# Patient Record
Sex: MALE | Race: OTHER | HISPANIC OR LATINO
[De-identification: names, ages, dates, MRNs, and addresses within clinical notes are randomized per-mention and may not be internally consistent; named-entity substitution may affect disease eponyms.]

---

## 2022-07-27 ENCOUNTER — APPOINTMENT (OUTPATIENT)
Dept: ORTHOPEDIC SURGERY | Facility: CLINIC | Age: 52
End: 2022-07-27

## 2022-07-27 DIAGNOSIS — S43.439A SUPERIOR GLENOID LABRUM LESION OF UNSPECIFIED SHOULDER, INITIAL ENCOUNTER: ICD-10-CM

## 2022-07-27 PROBLEM — Z00.00 ENCOUNTER FOR PREVENTIVE HEALTH EXAMINATION: Status: ACTIVE | Noted: 2022-07-27

## 2022-07-27 PROCEDURE — 73030 X-RAY EXAM OF SHOULDER: CPT | Mod: 50

## 2022-07-27 PROCEDURE — 99204 OFFICE O/P NEW MOD 45 MIN: CPT

## 2022-07-27 NOTE — REASON FOR VISIT
[FreeTextEntry2] : Right greater than left shoulder pain for 8 months an acute exacerbation of a chronic problem

## 2022-07-27 NOTE — ASSESSMENT
[FreeTextEntry1] :  right-hand-dominant  with diagnosis of right slap tear recommend therapy is already on a anti-inflammatory cream from his medical doctor see him back in 6 weeks if he is still symptomatic and consider imaging studies such as an MRI

## 2022-07-27 NOTE — PHYSICAL EXAM
[FreeTextEntry3] :  right shoulder has positive [place]'s nontender over the AC joint no effusion full range of motion negative impingement

## 2022-07-27 NOTE — DATA REVIEWED
[FreeTextEntry1] :  x-rays left and right shoulder three views each showing no arthritis no soft tissue calcifications

## 2022-08-17 ENCOUNTER — EMERGENCY (EMERGENCY)
Facility: HOSPITAL | Age: 52
LOS: 0 days | Discharge: HOME | End: 2022-08-17
Attending: EMERGENCY MEDICINE | Admitting: EMERGENCY MEDICINE

## 2022-08-17 VITALS
SYSTOLIC BLOOD PRESSURE: 116 MMHG | OXYGEN SATURATION: 98 % | HEART RATE: 78 BPM | RESPIRATION RATE: 16 BRPM | DIASTOLIC BLOOD PRESSURE: 65 MMHG | WEIGHT: 151.02 LBS | TEMPERATURE: 97 F

## 2022-08-17 DIAGNOSIS — R07.89 OTHER CHEST PAIN: ICD-10-CM

## 2022-08-17 DIAGNOSIS — R55 SYNCOPE AND COLLAPSE: ICD-10-CM

## 2022-08-17 DIAGNOSIS — R51.9 HEADACHE, UNSPECIFIED: ICD-10-CM

## 2022-08-17 DIAGNOSIS — R53.83 OTHER FATIGUE: ICD-10-CM

## 2022-08-17 LAB
ALBUMIN SERPL ELPH-MCNC: 4.5 G/DL — SIGNIFICANT CHANGE UP (ref 3.5–5.2)
ALP SERPL-CCNC: 71 U/L — SIGNIFICANT CHANGE UP (ref 30–115)
ALT FLD-CCNC: 35 U/L — SIGNIFICANT CHANGE UP (ref 0–41)
ANION GAP SERPL CALC-SCNC: 9 MMOL/L — SIGNIFICANT CHANGE UP (ref 7–14)
AST SERPL-CCNC: 32 U/L — SIGNIFICANT CHANGE UP (ref 0–41)
BASOPHILS # BLD AUTO: 0.03 K/UL — SIGNIFICANT CHANGE UP (ref 0–0.2)
BASOPHILS NFR BLD AUTO: 0.7 % — SIGNIFICANT CHANGE UP (ref 0–1)
BILIRUB SERPL-MCNC: 0.7 MG/DL — SIGNIFICANT CHANGE UP (ref 0.2–1.2)
BUN SERPL-MCNC: 14 MG/DL — SIGNIFICANT CHANGE UP (ref 10–20)
CALCIUM SERPL-MCNC: 8.9 MG/DL — SIGNIFICANT CHANGE UP (ref 8.5–10.1)
CHLORIDE SERPL-SCNC: 102 MMOL/L — SIGNIFICANT CHANGE UP (ref 98–110)
CO2 SERPL-SCNC: 27 MMOL/L — SIGNIFICANT CHANGE UP (ref 17–32)
CREAT SERPL-MCNC: 0.9 MG/DL — SIGNIFICANT CHANGE UP (ref 0.7–1.5)
EGFR: 104 ML/MIN/1.73M2 — SIGNIFICANT CHANGE UP
EOSINOPHIL # BLD AUTO: 0.03 K/UL — SIGNIFICANT CHANGE UP (ref 0–0.7)
EOSINOPHIL NFR BLD AUTO: 0.7 % — SIGNIFICANT CHANGE UP (ref 0–8)
GLUCOSE SERPL-MCNC: 89 MG/DL — SIGNIFICANT CHANGE UP (ref 70–99)
HCT VFR BLD CALC: 44.4 % — SIGNIFICANT CHANGE UP (ref 42–52)
HGB BLD-MCNC: 13.8 G/DL — LOW (ref 14–18)
IMM GRANULOCYTES NFR BLD AUTO: 0.2 % — SIGNIFICANT CHANGE UP (ref 0.1–0.3)
LYMPHOCYTES # BLD AUTO: 1.76 K/UL — SIGNIFICANT CHANGE UP (ref 1.2–3.4)
LYMPHOCYTES # BLD AUTO: 43.8 % — SIGNIFICANT CHANGE UP (ref 20.5–51.1)
MAGNESIUM SERPL-MCNC: 1.8 MG/DL — SIGNIFICANT CHANGE UP (ref 1.8–2.4)
MCHC RBC-ENTMCNC: 26.1 PG — LOW (ref 27–31)
MCHC RBC-ENTMCNC: 31.1 G/DL — LOW (ref 32–37)
MCV RBC AUTO: 83.9 FL — SIGNIFICANT CHANGE UP (ref 80–94)
MONOCYTES # BLD AUTO: 0.42 K/UL — SIGNIFICANT CHANGE UP (ref 0.1–0.6)
MONOCYTES NFR BLD AUTO: 10.4 % — HIGH (ref 1.7–9.3)
NEUTROPHILS # BLD AUTO: 1.77 K/UL — SIGNIFICANT CHANGE UP (ref 1.4–6.5)
NEUTROPHILS NFR BLD AUTO: 44.2 % — SIGNIFICANT CHANGE UP (ref 42.2–75.2)
NRBC # BLD: 0 /100 WBCS — SIGNIFICANT CHANGE UP (ref 0–0)
NT-PROBNP SERPL-SCNC: 23 PG/ML — SIGNIFICANT CHANGE UP (ref 0–300)
PLATELET # BLD AUTO: 182 K/UL — SIGNIFICANT CHANGE UP (ref 130–400)
POTASSIUM SERPL-MCNC: 5.2 MMOL/L — HIGH (ref 3.5–5)
POTASSIUM SERPL-SCNC: 5.2 MMOL/L — HIGH (ref 3.5–5)
PROT SERPL-MCNC: 7 G/DL — SIGNIFICANT CHANGE UP (ref 6–8)
RBC # BLD: 5.29 M/UL — SIGNIFICANT CHANGE UP (ref 4.7–6.1)
RBC # FLD: 13.1 % — SIGNIFICANT CHANGE UP (ref 11.5–14.5)
SODIUM SERPL-SCNC: 138 MMOL/L — SIGNIFICANT CHANGE UP (ref 135–146)
TROPONIN T SERPL-MCNC: <0.01 NG/ML — SIGNIFICANT CHANGE UP
WBC # BLD: 4.02 K/UL — LOW (ref 4.8–10.8)
WBC # FLD AUTO: 4.02 K/UL — LOW (ref 4.8–10.8)

## 2022-08-17 PROCEDURE — 70450 CT HEAD/BRAIN W/O DYE: CPT | Mod: 26,MA

## 2022-08-17 PROCEDURE — 93010 ELECTROCARDIOGRAM REPORT: CPT

## 2022-08-17 PROCEDURE — 71046 X-RAY EXAM CHEST 2 VIEWS: CPT | Mod: 26

## 2022-08-17 PROCEDURE — 99285 EMERGENCY DEPT VISIT HI MDM: CPT

## 2022-08-17 RX ORDER — ACETAMINOPHEN 500 MG
975 TABLET ORAL ONCE
Refills: 0 | Status: COMPLETED | OUTPATIENT
Start: 2022-08-17 | End: 2022-08-17

## 2022-08-17 RX ORDER — SODIUM CHLORIDE 9 MG/ML
1000 INJECTION INTRAMUSCULAR; INTRAVENOUS; SUBCUTANEOUS ONCE
Refills: 0 | Status: COMPLETED | OUTPATIENT
Start: 2022-08-17 | End: 2022-08-17

## 2022-08-17 RX ADMIN — Medication 975 MILLIGRAM(S): at 12:44

## 2022-08-17 RX ADMIN — SODIUM CHLORIDE 1000 MILLILITER(S): 9 INJECTION INTRAMUSCULAR; INTRAVENOUS; SUBCUTANEOUS at 12:44

## 2022-08-17 NOTE — ED PROVIDER NOTE - CLINICAL SUMMARY MEDICAL DECISION MAKING FREE TEXT BOX
52yoM prev healthy presents with syncope. States last night was laying back almost supine watching TV and got up to go to the kitchen. Felt lightheaded and held onto a table and passed out. Assisted to the ground and no trauma. Also notes daily intermittent frontal pressure like HA x1 mth. Also mild intermittent nonradiating L sided CP x 6 yrs ever since a cardiac cath, entirely unchanged or worsened. Denies all other symptoms including v/d, black or bloody stool, fever, SOB, cough, recent long distance travel, hormone use. On exam, afebrile, hemodynamically stable, saturating well, NAD, well appearing, laying comfortably in bed, no WOB, speaking full sentences, head NCAT, EOMI grossly, anicteric, MMM, no JVD, RRR, nml S1/S2, no m/r/g, lungs CTAB, no w/r/r, abd soft, NT, ND, nml BS, no rebound or guarding, AAO, CN's 3-12 intact, motor 5/5 in all extrems, BENTON spontaneously, no leg cyanosis or edema, skin warm, well perfused, no rashes or hives. No trauma. Character low suspicion for CVA and no focal or localizing findings. Character low suspicion for SAH. No ICH on CT head. No arrhythmia or e/o aortic outflow obstruction. No significant anemia or bleeding or gross electrolyte abnormalities. No new CP/SOB to suggest ACS or e/o DVT to suggest PE. No fever or specific infectious symptoms. May be orthostatic in nature. Given IVF. Patient is well appearing, NAD, afebrile, hemodynamically stable. Corry Syncope score very low risk. Any available tests and studies were discussed with patient. Discharged with instructions in further symptomatic care, return precautions, and need for cardio f/u.

## 2022-08-17 NOTE — ED ADULT TRIAGE NOTE - CHIEF COMPLAINT QUOTE
pt came to ED s/p syncopal episode last night while in his kitchen. reports headache, chest pain. pt also reports "difficulty speaking for the last few months"

## 2022-08-17 NOTE — ED PROVIDER NOTE - NS ED ATTENDING STATEMENT MOD
This was a shared visit with the JEN. I reviewed and verified the documentation and independently performed the documented:

## 2022-08-17 NOTE — ED PROVIDER NOTE - OBJECTIVE STATEMENT
52-year-old male no past medical history presenting to the ED for evaluation of syncopal episode last night.  Patient reports he was lying in bed when he got up to go to the kitchen and had a mild frontal headache and felt fatigued, had a syncopal episode lasted 1 minute.  Patient also endorsing intermittent left-sided chest pain for 1 year.  Patient denies active headache at this time, no active chest pain.  Denies any fever, chills, dizziness, numbness/tingling, weakness, shortness of breath.

## 2022-08-17 NOTE — ED PROVIDER NOTE - NS ED ROS FT
Constitutional: (-) fever (-) malaise (-) diaphoresis (-) chills   Eyes: (-) visual changes (-) eye pain (-) eye discharge (-) photophobia (-) FB sensation  Cardiac: (+) chest pain  (-) palpitations (-) syncope (-) edema  Respiratory: (-) cough (-) SOB (-) THOMASON  GI: (-) nausea (-) vomiting (-) diarrhea (-) abdominal pain   : (-) dysuria (-) increased frequency  (-) hematuria (-) incontinence  MS: (-) back pain (-) myalgia (-) muscle weakness (-)  joint pain  Neuro: (+) headache (-) dizziness (-) numbness/tingling to extremities B/L (-) weakness  Skin: (-) rash (-) laceration    Except as documented in the HPI, all other systems are negative.

## 2022-08-17 NOTE — ED PROVIDER NOTE - ATTENDING APP SHARED VISIT CONTRIBUTION OF CARE
52yoM prev healthy presents with syncope. States last night was laying back almost supine watching TV and got up to go to the kitchen. Felt lightheaded and held onto a table and passed out. Assisted to the ground and no trauma. Also notes daily intermittent frontal pressure like HA x1 mth. Also mild intermittent nonradiating L sided CP x 6 yrs ever since a cardiac cath, entirely unchanged or worsened. Denies all other symptoms including v/d, black or bloody stool, fever, SOB, cough, recent long distance travel, hormone use. On exam, afebrile, hemodynamically stable, saturating well, NAD, well appearing, laying comfortably in bed, no WOB, speaking full sentences, head NCAT, EOMI grossly, anicteric, MMM, no JVD, RRR, nml S1/S2, no m/r/g, lungs CTAB, no w/r/r, abd soft, NT, ND, nml BS, no rebound or guarding, AAO, CN's 3-12 intact, motor 5/5 in all extrems, B[place] spontaneously, no leg cyanosis or edema, skin warm, well perfused, no rashes or hives. No trauma. Character low suspicion for CVA and no focal or localizing findings. Character low suspicion for SAH. No ICH on CT head. No arrhythmia or e/o aortic outflow obstruction. No significant anemia or bleeding or gross electrolyte abnormalities. No new CP/SOB to suggest ACS or e/o DVT to suggest PE. No fever or specific infectious symptoms. May be orthostatic in nature. Given IVF. Patient is well appearing, NAD, afebrile, hemodynamically stable. Preston Syncope score very low risk. Any available tests and studies were discussed with patient. Discharged with instructions in further symptomatic care, return precautions, and need for cardio f/u.

## 2022-08-17 NOTE — ED PROVIDER NOTE - NSFOLLOWUPCLINICS_GEN_ALL_ED_FT
[facility]  Cardiac Rehab  [address] [place] [address]  Phone: [phone]  Fax:   Follow Up Time: 4-6 Days

## 2022-08-17 NOTE — ED PROVIDER NOTE - CARE PLAN
1 Principal Discharge DX:	Syncope  Secondary Diagnosis:	Chest pain   Principal Discharge DX:	Syncope  Secondary Diagnosis:	Chest pain  Secondary Diagnosis:	Headache

## 2022-08-17 NOTE — ED PROVIDER NOTE - NSFOLLOWUPINSTRUCTIONS_ED_ALL_ED_FT
Our Emergency Department Referral Coordinators will be reaching out ot you in the next 24-48 hours from 9:00am to 5:00pm (Monday to Friday) with a follow up appointment. Please expect a phone call from the hospital in that time frame. If you do not receive a call or if you have any questions or concerns, you can reach them at [phone] or [phone].      You were noted to have what is called, a syncopal episode, which is an event when you temporarily lose consciousness. These events happen for a variety of reasons and you were kept in the hospital to evaluate what the cause of your event was. Thankfully, these events in themselves do not leave any long lasting effects on your body, however, they may happen again if the cause of the problem is not found and treated if need be. Many people never find out what caused their event. If you have been advised to follow up with any doctors, or specialists, please be sure to do so.     Chest Pain    Chest pain can be caused by many different conditions which may or may not be dangerous. Causes include heartburn, lung infections, heart attack, blood clot in lungs, skin infections, strain or damage to muscle, cartilage, or bones, etc. In addition to a history and physical examination, an electrocardiogram (ECG) or other lab tests may have been performed to determine the cause of your chest pain. Follow up with your primary care provider or with a cardiologist as instructed.     SEEK IMMEDIATE MEDICAL CARE IF YOU HAVE ANY OF THE FOLLOWING SYMPTOMS: worsening chest pain, coughing up blood, unexplained back/neck/jaw pain, severe abdominal pain, dizziness or lightheadedness, fainting, shortness of breath, sweaty or clammy skin, vomiting, or racing heart beat. These symptoms may represent a serious problem that is an emergency. Do not wait to see if the symptoms will go away. Get medical help right away. Call 911 and do not drive yourself to the hospital.

## 2022-08-17 NOTE — ED PROVIDER NOTE - PATIENT PORTAL LINK FT
You can access the FollowMyHealth Patient Portal offered by [facility] by registering at the following website: [email or web address]. By joining [facility]’s FollowMyHealth portal, you will also be able to view your health information using other applications (apps) compatible with our system.

## 2022-10-24 ENCOUNTER — APPOINTMENT (OUTPATIENT)
Dept: ORTHOPEDIC SURGERY | Facility: CLINIC | Age: 52
End: 2022-10-24

## 2022-10-24 DIAGNOSIS — S43.431D SUPERIOR GLENOID LABRUM LESION OF RIGHT SHOULDER, SUBSEQUENT ENCOUNTER: ICD-10-CM

## 2022-10-24 PROCEDURE — 99213 OFFICE O/P EST LOW 20 MIN: CPT

## 2022-10-24 NOTE — PHYSICAL EXAM
[Right] : right shoulder [] : motor and sensory intact distally [TWNoteComboBox7] : active forward flexion 165 degrees [TWNoteComboBox4] : passive forward flexion 180 degrees [de-identified] : active abduction 130 degrees [TWNoteComboBox6] : internal rotation L4

## 2022-10-24 NOTE — DISCUSSION/SUMMARY
[de-identified] :   At this point I recommend an MRI right shoulder to evaluate for a labral tear.  Please send authorization for that.  He has had more than 6 weeks of physician directed treatment including anti-inflammatory medication and home therapy exercises but still has pain in the shoulder.  He will call me 2 days after the MRI is performed so we can discuss results, I will see him in 2 months for further evaluation.\par \par Supervising physician:  Dr. Hernandez

## 2022-10-24 NOTE — HISTORY OF PRESENT ILLNESS
[de-identified] : The patient is a 52-year-old male here for subsequent re-evaluation of his right shoulder.  He is still having pain in the shoulder but it has decreased in severity since he stopped doing  work.  He has done home therapy exercises for the shoulder.  He also has used anti-inflammatory cream given by his primary care physician.

## 2022-11-07 ENCOUNTER — APPOINTMENT (OUTPATIENT)
Dept: MRI IMAGING | Facility: CLINIC | Age: 52
End: 2022-11-07

## 2022-11-15 ENCOUNTER — EMERGENCY (EMERGENCY)
Facility: HOSPITAL | Age: 52
LOS: 0 days | Discharge: HOME | End: 2022-11-15
Attending: EMERGENCY MEDICINE | Admitting: EMERGENCY MEDICINE

## 2022-11-15 VITALS
DIASTOLIC BLOOD PRESSURE: 84 MMHG | TEMPERATURE: 98 F | SYSTOLIC BLOOD PRESSURE: 136 MMHG | RESPIRATION RATE: 18 BRPM | HEART RATE: 78 BPM | OXYGEN SATURATION: 100 %

## 2022-11-15 DIAGNOSIS — R42 DIZZINESS AND GIDDINESS: ICD-10-CM

## 2022-11-15 DIAGNOSIS — Z20.822 CONTACT WITH AND (SUSPECTED) EXPOSURE TO COVID-19: ICD-10-CM

## 2022-11-15 DIAGNOSIS — R51.9 HEADACHE, UNSPECIFIED: ICD-10-CM

## 2022-11-15 DIAGNOSIS — R20.0 ANESTHESIA OF SKIN: ICD-10-CM

## 2022-11-15 DIAGNOSIS — E78.00 PURE HYPERCHOLESTEROLEMIA, UNSPECIFIED: ICD-10-CM

## 2022-11-15 LAB
A1C WITH ESTIMATED AVERAGE GLUCOSE RESULT: 5.6 % — SIGNIFICANT CHANGE UP (ref 4–5.6)
ALBUMIN SERPL ELPH-MCNC: 4.7 G/DL — SIGNIFICANT CHANGE UP (ref 3.5–5.2)
ALP SERPL-CCNC: 74 U/L — SIGNIFICANT CHANGE UP (ref 30–115)
ALT FLD-CCNC: 39 U/L — SIGNIFICANT CHANGE UP (ref 0–41)
ANION GAP SERPL CALC-SCNC: 7 MMOL/L — SIGNIFICANT CHANGE UP (ref 7–14)
APTT BLD: 32.4 SEC — SIGNIFICANT CHANGE UP (ref 27–39.2)
AST SERPL-CCNC: 25 U/L — SIGNIFICANT CHANGE UP (ref 0–41)
BASOPHILS # BLD AUTO: 0.03 K/UL — SIGNIFICANT CHANGE UP (ref 0–0.2)
BASOPHILS NFR BLD AUTO: 0.7 % — SIGNIFICANT CHANGE UP (ref 0–1)
BILIRUB SERPL-MCNC: 0.3 MG/DL — SIGNIFICANT CHANGE UP (ref 0.2–1.2)
BUN SERPL-MCNC: 10 MG/DL — SIGNIFICANT CHANGE UP (ref 10–20)
CALCIUM SERPL-MCNC: 9.1 MG/DL — SIGNIFICANT CHANGE UP (ref 8.4–10.5)
CHLORIDE SERPL-SCNC: 101 MMOL/L — SIGNIFICANT CHANGE UP (ref 98–110)
CHOLEST SERPL-MCNC: 145 MG/DL — SIGNIFICANT CHANGE UP
CK MB CFR SERPL CALC: 1.2 NG/ML — SIGNIFICANT CHANGE UP (ref 0.6–6.3)
CK SERPL-CCNC: 139 U/L — SIGNIFICANT CHANGE UP (ref 0–225)
CO2 SERPL-SCNC: 27 MMOL/L — SIGNIFICANT CHANGE UP (ref 17–32)
CREAT SERPL-MCNC: 1.1 MG/DL — SIGNIFICANT CHANGE UP (ref 0.7–1.5)
EGFR: 82 ML/MIN/1.73M2 — SIGNIFICANT CHANGE UP
EOSINOPHIL # BLD AUTO: 0.04 K/UL — SIGNIFICANT CHANGE UP (ref 0–0.7)
EOSINOPHIL NFR BLD AUTO: 1 % — SIGNIFICANT CHANGE UP (ref 0–8)
ESTIMATED AVERAGE GLUCOSE: 114 MG/DL — SIGNIFICANT CHANGE UP (ref 68–114)
GLUCOSE SERPL-MCNC: 88 MG/DL — SIGNIFICANT CHANGE UP (ref 70–99)
HCT VFR BLD CALC: 45.1 % — SIGNIFICANT CHANGE UP (ref 42–52)
HDLC SERPL-MCNC: 31 MG/DL — LOW
HGB BLD-MCNC: 14.2 G/DL — SIGNIFICANT CHANGE UP (ref 14–18)
IMM GRANULOCYTES NFR BLD AUTO: 0.2 % — SIGNIFICANT CHANGE UP (ref 0.1–0.3)
INR BLD: 1.07 RATIO — SIGNIFICANT CHANGE UP (ref 0.65–1.3)
LIPID PNL WITH DIRECT LDL SERPL: 92 MG/DL — SIGNIFICANT CHANGE UP
LYMPHOCYTES # BLD AUTO: 1.34 K/UL — SIGNIFICANT CHANGE UP (ref 1.2–3.4)
LYMPHOCYTES # BLD AUTO: 32.4 % — SIGNIFICANT CHANGE UP (ref 20.5–51.1)
MCHC RBC-ENTMCNC: 26.6 PG — LOW (ref 27–31)
MCHC RBC-ENTMCNC: 31.5 G/DL — LOW (ref 32–37)
MCV RBC AUTO: 84.6 FL — SIGNIFICANT CHANGE UP (ref 80–94)
MONOCYTES # BLD AUTO: 0.4 K/UL — SIGNIFICANT CHANGE UP (ref 0.1–0.6)
MONOCYTES NFR BLD AUTO: 9.7 % — HIGH (ref 1.7–9.3)
NEUTROPHILS # BLD AUTO: 2.31 K/UL — SIGNIFICANT CHANGE UP (ref 1.4–6.5)
NEUTROPHILS NFR BLD AUTO: 56 % — SIGNIFICANT CHANGE UP (ref 42.2–75.2)
NON HDL CHOLESTEROL: 114 MG/DL — SIGNIFICANT CHANGE UP
NRBC # BLD: 0 /100 WBCS — SIGNIFICANT CHANGE UP (ref 0–0)
PLATELET # BLD AUTO: 203 K/UL — SIGNIFICANT CHANGE UP (ref 130–400)
POTASSIUM SERPL-MCNC: 4.3 MMOL/L — SIGNIFICANT CHANGE UP (ref 3.5–5)
POTASSIUM SERPL-SCNC: 4.3 MMOL/L — SIGNIFICANT CHANGE UP (ref 3.5–5)
PROT SERPL-MCNC: 7 G/DL — SIGNIFICANT CHANGE UP (ref 6–8)
PROTHROM AB SERPL-ACNC: 12.2 SEC — SIGNIFICANT CHANGE UP (ref 9.95–12.87)
RBC # BLD: 5.33 M/UL — SIGNIFICANT CHANGE UP (ref 4.7–6.1)
RBC # FLD: 13 % — SIGNIFICANT CHANGE UP (ref 11.5–14.5)
SARS-COV-2 RNA SPEC QL NAA+PROBE: SIGNIFICANT CHANGE UP
SODIUM SERPL-SCNC: 135 MMOL/L — SIGNIFICANT CHANGE UP (ref 135–146)
TRIGL SERPL-MCNC: 108 MG/DL — SIGNIFICANT CHANGE UP
TROPONIN T SERPL-MCNC: <0.01 NG/ML — SIGNIFICANT CHANGE UP
WBC # BLD: 4.13 K/UL — LOW (ref 4.8–10.8)
WBC # FLD AUTO: 4.13 K/UL — LOW (ref 4.8–10.8)

## 2022-11-15 PROCEDURE — 70498 CT ANGIOGRAPHY NECK: CPT | Mod: 26,MA

## 2022-11-15 PROCEDURE — 93010 ELECTROCARDIOGRAM REPORT: CPT

## 2022-11-15 PROCEDURE — 71045 X-RAY EXAM CHEST 1 VIEW: CPT | Mod: 26

## 2022-11-15 PROCEDURE — 70496 CT ANGIOGRAPHY HEAD: CPT | Mod: 26,MA

## 2022-11-15 PROCEDURE — 99220: CPT

## 2022-11-15 PROCEDURE — 0042T: CPT

## 2022-11-15 RX ORDER — METOCLOPRAMIDE HCL 10 MG
10 TABLET ORAL ONCE
Refills: 0 | Status: COMPLETED | OUTPATIENT
Start: 2022-11-15 | End: 2022-11-15

## 2022-11-15 RX ORDER — KETOROLAC TROMETHAMINE 30 MG/ML
15 SYRINGE (ML) INJECTION ONCE
Refills: 0 | Status: DISCONTINUED | OUTPATIENT
Start: 2022-11-15 | End: 2022-11-15

## 2022-11-15 RX ORDER — MECLIZINE HCL 12.5 MG
50 TABLET ORAL ONCE
Refills: 0 | Status: COMPLETED | OUTPATIENT
Start: 2022-11-15 | End: 2022-11-15

## 2022-11-15 RX ADMIN — Medication 15 MILLIGRAM(S): at 17:20

## 2022-11-15 RX ADMIN — Medication 50 MILLIGRAM(S): at 17:31

## 2022-11-15 RX ADMIN — Medication 10 MILLIGRAM(S): at 17:31

## 2022-11-15 NOTE — ED PROVIDER NOTE - CLINICAL SUMMARY MEDICAL DECISION MAKING FREE TEXT BOX
52-year-old Greek-speaking male past medical history of dyslipidemia presents to the emergency department with headache and dizziness and right hand tingling that started this morning.  Patient reports he has pain to the top of his head into the back of his neck and has intermittent headaches had 1 this morning.  Patient reports his left hand started tingling given his symptoms he came to the ED for evaluation.  Stroke code called upon arrival in triage.  Fingerstick done normal.    Patient rushed to CT scan to evaluate brain and CTA and perfusion studies performed.  Initial NIH score was 1 for sensation to left hand.  Large-bore IV placed and full labs sent.  Patient has no active chest pain but does report pain to his chest on and off occasionally.  Patient evaluated by neurology at bedside.    ED work-up reviewed and shows no acute neurological findings.  Neurology consult appreciated we will send patient status for further work-up and management, telemetry monitoring, MRI.  Patient given meclizine, Reglan, Toradol and fluids for symptoms.  After CT scan patient ambulatory with me to the bathroom and had no focal issues.

## 2022-11-15 NOTE — ED ADULT TRIAGE NOTE - CHIEF COMPLAINT QUOTE
C/o headache and dizziness and headache x 30 minutes. As per EMS, pts coworker noticed slurred speech which has now resolved

## 2022-11-15 NOTE — ED PROVIDER NOTE - CARE PLAN
1 Principal Discharge DX:	Headache  Secondary Diagnosis:	Dizziness  Secondary Diagnosis:	Hand tingling

## 2022-11-15 NOTE — ED PROVIDER NOTE - PHYSICAL EXAMINATION
CONSTITUTIONAL: NAD  SKIN: Warm dry  HEAD: NCAT  EYES: NL inspection  ENT: MMM  NECK: Supple; non tender.  CARD: RRR  RESP: CTAB  ABD: S/NT no R/G  EXT: no pedal edema  NEURO: Grossly unremarkable, +decreased left hand sensation, motor strength intact bl  PSYCH: Cooperative, appropriate.

## 2022-11-15 NOTE — ED PROVIDER NOTE - OBJECTIVE STATEMENT
51 yo m w ho hld pw headache and right hand tingling. Patient was working and between [id number] while stressed on the computer. He developed a frontal headache 5/10, chest palpitations and subtle lightheadedness. Admits to hand tingling

## 2022-11-15 NOTE — CONSULT NOTE ADULT - SUBJECTIVE AND OBJECTIVE BOX
Neurology Consult    Patient is a 52y old  Male who presents with a chief complaint of headache    HPI:  52 year old gentleman with a PMH of DLD presents to the ED with headache and right hand tingling. Patient was working and between [id number] he was stressed at work while on the computer. He developed a frontal headache 5/10 and subtle lightheadedness. Patient reports left hand tingling.   Stroke code on arrival.     PAST MEDICAL & SURGICAL HISTORY:      FAMILY HISTORY:      Social History: (-) x 3    Allergies    No Known Allergies    Intolerances        MEDICATIONS  (STANDING):    MEDICATIONS  (PRN):    Vital Signs Last 24 Hrs  T(C): 36.6 (15 Nov 2022 14:38), Max: 36.6 (15 Nov 2022 14:38)  T(F): 97.9 (15 Nov 2022 14:38), Max: 97.9 (15 Nov 2022 14:38)  HR: 78 (15 Nov 2022 14:38) (78 - 78)  BP: 136/84 (15 Nov 2022 14:38) (136/84 - 136/84)  BP(mean): --  RR: 18 (15 Nov 2022 14:38) (18 - 18)  SpO2: 100% (15 Nov 2022 14:38) (100% - 100%)    Parameters below as of 15 Nov 2022 14:38  Patient On (Oxygen Delivery Method): nasal cannula        Examination:  General:  Appearance is consistent with chronologic age.  No abnormal facies.  Gross skin survey within normal limits.    Cognitive/Language:  The patient is oriented to person, place, time and date.  Recent and remote memory intact.  Fund of knowledge is intact and normal.  Language with normal repetition, comprehension and naming.  Nondysarthric.    Eyes: intact VA, VFF.  EOMI w/o nystagmus, skew or reported double vision.  PERRL.  No ptosis/weakness of eyelid closure.    Face:  Facial sensation normal V1 - 3, no facial asymmetry.    Motor examination:   Normal tone, bulk and range of motion.  No tenderness, twitching, tremors or involuntary movements.  Formal Muscle Strength Testing: (MRC grade R/L) 5/5 UE; 5/5 LE.  No observable drift.  Sensory examination:   Intact to light touch in all extremities except decreased sensation to left hand.  Cerebellum:   FTN/HKS intact with normal JULIETA in all limbs.  No dysmetria or dysdiadokinesia.  Gait deferred    NIHSS 1    Labs:   CBC Full  -  ( 15 Nov 2022 15:29 )  WBC Count : 4.13 K/uL  RBC Count : 5.33 M/uL  Hemoglobin : 14.2 g/dL  Hematocrit : 45.1 %  Platelet Count - Automated : 203 K/uL  Mean Cell Volume : 84.6 fL  Mean Cell Hemoglobin : 26.6 pg  Mean Cell Hemoglobin Concentration : 31.5 g/dL  Auto Neutrophil # : 2.31 K/uL  Auto Lymphocyte # : 1.34 K/uL  Auto Monocyte # : 0.40 K/uL  Auto Eosinophil # : 0.04 K/uL  Auto Basophil # : 0.03 K/uL  Auto Neutrophil % : 56.0 %  Auto Lymphocyte % : 32.4 %  Auto Monocyte % : 9.7 %  Auto Eosinophil % : 1.0 %  Auto Basophil % : 0.7 %    11-15    135  |  101  |  10  ----------------------------<  88  4.3   |  27  |  1.1    Ca    9.1      15 Nov 2022 15:29    TPro  7.0  /  Alb  4.7  /  TBili  0.3  /  DBili  x   /  AST  25  /  ALT  39  /  AlkPhos  74  11-15    LIVER FUNCTIONS - ( 15 Nov 2022 15:29 )  Alb: 4.7 g/dL / Pro: 7.0 g/dL / ALK PHOS: 74 U/L / ALT: 39 U/L / AST: 25 U/L / GGT: x           PT/INR - ( 15 Nov 2022 15:29 )   PT: 12.20 sec;   INR: 1.07 ratio         PTT - ( 15 Nov 2022 15:29 )  PTT:32.4 sec        Neuroimaging:  NCHCT:   < from: CT Angio Head w/ IV Cont (11.15.22 @ 15:13) >  IMPRESSION:    CT PERFUSION:  No evidence of perfusion abnormality.    CTA HEAD:  No evidence of flow-limiting stenosis, occlusion or aneurysm.    CTA NECK:  No evidence of carotid or vertebral artery stenosis.      < end of copied text >  < from: CT Head No Cont (08.17.22 @ 12:59) >  IMPRESSION:  No acute intracranial pathology. No evidence of midline shift, mass   effect or intracranial hemorrhage.    Mild chronic microvascular type changes.      < end of copied text >    11-15-22 @ 16:25

## 2022-11-15 NOTE — ED CDU PROVIDER INITIAL DAY NOTE - OBJECTIVE STATEMENT
53 yo M hx of dyslipidemia c/o HA and right hand tingling at 8:10am today.  HA was "7" in severity and has since improved. No CP, SOB, abdominal pains or weakness to extremities.

## 2022-11-15 NOTE — CONSULT NOTE ADULT - ASSESSMENT
Impression:  52 year old gentleman with a PMH of DLD presents to the ED with headache and right hand tingling. Patient was working and between [id number] he was stressed at work while on the computer. He developed a frontal headache 5/10, chest palpitations and subtle lightheadedness. Patient reports left hand tingling. Stroke code on arrival. Patient out of the window for IV thrombolytics. NIHSS 1 for left hand tingling therefore not a candidate for IA intervention. Patient with no acute intracranial pathology on CTH and no LVO on CTA. Differential diagnosis favors complex migraine over ischemic stroke.     Suggestion:  MRI brain without emli  Telemetry monitoring  Q4 neuro checks.   LDL A1C  ED observational unit.

## 2022-11-15 NOTE — ED CDU PROVIDER INITIAL DAY NOTE - MEDICAL DECISION MAKING DETAILS
52-year-old Croatian-speaking male past medical history of dyslipidemia presents to the emergency department with headache and dizziness and right hand tingling that started this morning.  Patient reports he has pain to the top of his head into the back of his neck and has intermittent headaches had 1 this morning.  Patient reports his left hand started tingling given his symptoms he came to the ED for evaluation.  Stroke code called upon arrival in triage.  Fingerstick done normal.    Patient rushed to CT scan to evaluate brain and CTA and perfusion studies performed.  Initial NIH score was 1 for sensation to left hand.  Large-bore IV placed and full labs sent.  Patient has no active chest pain but does report pain to his chest on and off occasionally.  Patient evaluated by neurology at bedside.    ED work-up reviewed and shows no acute neurological findings.  Labs reviewed.  Patient sent status for MRI and telemetry monitoring.  Patient given symptomatic treatment for the headache and dizziness which improved symptoms.

## 2022-11-15 NOTE — ED PROVIDER NOTE - ATTENDING CONTRIBUTION TO CARE
I personally evaluated patient. I agree with the findings and plan with all documentation on chart except as documented  in my note.    52-year-old Nepali-speaking male past medical history of dyslipidemia presents to the emergency department with headache and dizziness and right hand tingling that started this morning.  Patient reports he has pain to the top of his head into the back of his neck and has intermittent headaches had 1 this morning.  Patient reports his left hand started tingling given his symptoms he came to the ED for evaluation.  Stroke code called upon arrival in triage.  Fingerstick done normal.    Patient rushed to CT scan to evaluate brain and CTA and perfusion studies performed.  Initial NIH score was 1 for sensation to left hand.  Large-bore IV placed and full labs sent.  Patient has no active chest pain but does report pain to his chest on and off occasionally.  Patient evaluated by neurology at bedside.    ED work-up reviewed and shows no acute neurological findings.  Neurology consult appreciated we will send patient status for further work-up and management, telemetry monitoring, MRI.  Patient given meclizine, Reglan, Toradol and fluids for symptoms.  After CT scan patient ambulatory with me to the bathroom and had no focal issues.

## 2022-11-15 NOTE — ED CDU PROVIDER INITIAL DAY NOTE - ATTENDING APP SHARED VISIT CONTRIBUTION OF CARE
52-year-old Tongan-speaking male past medical history of dyslipidemia presents to the emergency department with headache and dizziness and right hand tingling that started this morning.  Patient reports he has pain to the top of his head into the back of his neck and has intermittent headaches had 1 this morning.  Patient reports his left hand started tingling given his symptoms he came to the ED for evaluation.  Stroke code called upon arrival in triage.  Fingerstick done normal.    Patient rushed to CT scan to evaluate brain and CTA and perfusion studies performed.  Initial NIH score was 1 for sensation to left hand.  Large-bore IV placed and full labs sent.  Patient has no active chest pain but does report pain to his chest on and off occasionally.  Patient evaluated by neurology at bedside.    ED work-up reviewed and shows no acute neurological findings.  Labs reviewed.  Patient sent status for MRI and telemetry monitoring.  Patient given symptomatic treatment for the headache and dizziness which improved symptoms.

## 2022-11-15 NOTE — ED CDU PROVIDER INITIAL DAY NOTE - NS ED ROS FT
Review of Systems    Constitutional: (-) fever, (-) chills  Eyes/ENT: (-) blurry vision, (-) epistaxis, (-) sore throat  Cardiovascular: (-) chest pain, (-) syncope  Respiratory: (-) cough, (-) shortness of breath  Gastrointestinal: (-) pain, (-) nausea, (-) vomiting, (-) diarrhea  Musculoskeletal: (-) neck pain, (-) back pain, (-) body aches  Integumentary: (-) rash, (-) edema  Neurological: (+) headache, (+) right arm tingling, (-) altered mental status  Psychiatric: (-) hallucinations  Allergic/Immunologic: (-) pruritus

## 2022-11-16 VITALS — WEIGHT: 149.91 LBS

## 2022-11-16 PROCEDURE — 70551 MRI BRAIN STEM W/O DYE: CPT | Mod: 26,MA

## 2022-11-16 PROCEDURE — 99217: CPT

## 2022-11-16 NOTE — ED CDU PROVIDER DISPOSITION NOTE - NSFOLLOWUPCLINICS_GEN_ALL_ED_FT
[facility]  Neurology  [address] [place] [address], [address]  [place] [address]  Phone: [phone]  Fax:   Follow Up Time: 1-3 Days

## 2022-11-16 NOTE — ED CDU PROVIDER DISPOSITION NOTE - NSFOLLOWUPINSTRUCTIONS_ED_ALL_ED_FT
Dolor de antonio general sin causa    General Headache Without Cause      El dolor de antonio es un dolor o malestar que se siente en la aramis de la antonio o del breann. Hay muchas causas y tipos de velasquez de antonio. Entre los tipos más frecuentes, se incluyen los siguientes:  •Cefaleas tensionales.      •Cefaleas migrañosas.      •Cefalea en brotes.      •Cefaleas diarias crónicas.      En ocasiones, el dolor de antonio puede no tener shira causa específica.      Siga estas indicaciones en [place] casa:    Controle [place] afección para detectar cualquier cambio. Informe a [place] médico acerca de cualquier cambio. Siga estos pasos para controlar [place] afección:      Control del dolor       A bag of ice on a towel on the skin.        A heating pad for use on the painful area.      •Use los medicamentos de venta alejo y los recetados solamente johnathon se lo haya indicado el médico. El tratamiento puede incluir medicamentos para el dolor que se beltran por boca o que se aplican sobre la piel.      •Cuando sienta dolor de antonio acuéstese en un cuarto oscuro y tranquilo.      •Mantenga las luces tenues si las luces brillantes le molestan o raina velasquez de antonio empeoran.    •Si se lo indican, aplíquese hielo en la antonio y en la aramis del breann:  •Ponga el hielo en shira bolsa plástica.      •Coloque shira toalla entre la piel y la bolsa.      •Aplique el hielo rosa 20 minutos, 2 a 3 veces al día.      •Retire el hielo si la piel se pone de color borges brillante. [place] es muy importante. Si no puede sentir dolor, calor o frío, tiene un mayor riesgo de que se dañe la aramis.      •Si se lo indican, aplique calor en la aramis afectada. Use la giselle de calor que el médico le recomiende, johnathon shira compresa de calor húmedo o shira almohadilla térmica.  •Coloque shira toalla entre la piel y la giselle de calor.      •Aplique calor orsa 20 a 30 minutos.      •Retire la giselle de calor si la piel se pone de color borges brillante. [place] es especialmente importante si no puede sentir dolor, calor o frío. Corre un mayor riesgo de sufrir quemaduras.        Comida y bebida     •Mantenga un horario para las comidas.    •Si angelina alcohol:•Limite la cantidad que angelina a lo siguiente:  •De 0 a 1 medida por día para las mujeres que no están embarazadas.      • De 0 a 2 medidas por día para los hombres.         •Sepa cuánta cantidad de alcohol hay en las bebidas. En los [place], shira medida equivale a shira botella de cerveza de 12 oz (355 ml), un vaso de vino de 5 oz (148 ml) o un vaso de shira bebida alcohólica de ethan graduación de 1½ oz (44 ml).        •Deje de marly cafeína o disminuya la cantidad que consume.      •Ramila suficiente líquido johnathon para mantener la orina de color amarillo pálido.        Indicaciones generales   A person writing in a journal.  •Lleve un diario de los velasquez de antonio para averiguar qué factores pueden desencadenarlos. Registre, por ejemplo, lo siguiente:  •Lo que usted come y angelina.      •El tiempo que duerme.      •Algún cambio en [place] dieta o en los medicamentos.        •Pruebe algunas técnicas de relajación, johnathon los masajes.      •Limite el estrés.      •Siéntese con la espalda recta y no tense los músculos.      • No consuma ningún producto que contenga nicotina o tabaco. Estos productos incluyen cigarrillos, tabaco para mascar y aparatos de vapeo, johnathon los cigarrillos electrónicos. Si necesita ayuda para dejar de consumir estos productos, consulte al médico.      •Keenan actividad física habitualmente johnathon se lo haya indicado el médico.      •Tenga un horario fijo para dormir. Duerma entre 7 y 9 horas todas las noches o la cantidad de horas que le haya recomendado el médico.      •Concurra a todas las visitas de seguimiento. [place] es importante.        Comuníquese con un médico si:    •Los medicamentos no le alivian los síntomas.      •Tiene un dolor de antonio que es diferente de la cefalea habitual.      •Tiene náuseas o vómitos.      •Tiene fiebre.        Solicite ayuda de inmediato si:  •El dolor de antonio:  •Se vuelve intenso rápidamente.      •Empeora después de hacer actividad física moderada o intensa.      •Tiene alguno de estos síntomas:  •Vómitos repetidos.      •Dolor o rigidez en el breann.      •Cambios en la visión.      •Dolor en un jaiden o en un oído.      •Problemas para hablar.      •Debilidad muscular o pérdida del control muscular.      •Pérdida del equilibrio o de la coordinación.        •Sufre mareos o se desmaya.      •Experimenta confusión.      •Tiene shira convulsión.      Estos síntomas pueden representar un problema grave que constituye shira emergencia. No espere a jamila si los síntomas desaparecen. Solicite atención médica de inmediato. Comuníquese con el servicio de emergencias de [place] localidad (911 en los [place]). No conduzca por raina propios medios hasta el hospital.       Resumen    •El dolor de antonio es un dolor o malestar que se siente en la aramis de la antonio o del breann.      •Hay muchas causas y tipos de velasquez de antonio. En algunos casos, es posible que no se encuentre la causa.      •Lleve un diario de los velasquez de antonio para averiguar qué factores pueden desencadenarlos. Controle [place] afección para detectar cualquier cambio. Informe a [place] médico acerca de cualquier cambio.      •Comuníquese con un médico si tiene un dolor de antonio que es diferente de lo habitual o si los síntomas no se alivian con los medicamentos.      •Solicite ayuda de inmediato si el dolor se vuelve intenso, vomita, tiene pérdida de la visión, pierde el equilibrio o tiene shira convulsión.      Esta información no tiene johnathon fin reemplazar el consejo del médico. Asegúrese de hacerle al médico cualquier pregunta que tenga.      Document Revised: 06/07/2022 Document Reviewed: 06/07/2022    [employer] Patient Education © 2022 [employer]         Parestesia    Paresthesia      La parestesia es shira sensación de ardor o picazón fuera de lo normal. Suele sentirse en las karen, los brazos, las piernas o los pies. Sin embargo, puede manifestarse en cualquier parte del cuerpo. Por lo general, la parestesia no es dolorosa. Se puede sentir johnathon lo siguiente:  •Hormigueo o entumecimiento.      •Vibración.      •Picazón.      La parestesia puede producirse sin causa aparente, o puede ser provocada por:  •Respirar demasiado rápido (hiperventilación).      •Presión en un nervio.      •Shira afección médica subyacente.      •Efectos secundarios de un medicamento.      •Deficiencias nutricionales.      •Exposición a sustancias químicas.      La mayoría de las personas sienten parestesia temporal en algún momento de la sesar. Para algunas personas, puede ser a rose plazo (crónica) debido a shira afección médica subyacente. Si tiene parestesia por mucho tiempo, es necesario que el médico le keenan shira evaluación.      Siga estas instrucciones en [place] casa:      Consumo de alcohol    • No ramila alcohol si:  •[place] médico le indica no hacerlo.      •Está embarazada, puede estar embarazada o está tratando de quedar embarazada.      •Si angelina alcohol:•Limite la cantidad que angelina:  •De 0 a 1 medida por día para las mujeres.      •De 0 a 2 medidas por día para los hombres.        •Esté atento a la cantidad de alcohol que hay en las bebidas que kerry. En [place], shira medida equivale a shira botella de cerveza de 12 oz (355 ml), un vaso de vino de 5 oz (148 ml) o un vaso de shira bebida alcohólica de ethan graduación de 1½ oz (44 ml).          Nutrición    •Siga shira dieta saludable. [place] incluye:  •Consumir alimentos ricos en fibra, johnathon frutas y verduras frescas, cereales integrales y frijoles.      •Limitar el consumo de alimentos ricos en grasas y azúcares procesados, johnathon los alimentos fritos o dulces.        Indicaciones generales     •Use los medicamentos de venta alejo y los recetados solamente johnathon se lo haya indicado el médico.      • No consuma ningún producto que contenga nicotina o tabaco, johnathon cigarrillos y cigarrillos electrónicos. Estos productos evitan que la smiley llegue a los nervios dañados. Si necesita ayuda para dejar de consumir estos productos, consulte al médico.      •Si tiene diabetes, trabaje estrechamente con el médico para mantener bajo control el nivel de azúcar en la smiley.    •Si siente adormecimiento en los pies, keenan lo siguiente:  •Realice controles todos los días para detectar signos de lesión o infección. Observe señales de enrojecimiento, calor e hinchazón.      •Use calcetines acolchados y zapatos cómodos. Estos ayudan a proteger los pies.        •Concurra a todas las visitas de seguimiento johnathon se lo haya indicado el médico. [place] es importante.        Comuníquese con un médico si:    •Tiene parestesia que empeora o no desaparece.      •Siente adormecimiento después de shira lesión.      •La sensación de ardor o picazón empeora al caminar.      •Tiene dolor, calambres o mareos, o se desmaya.      •Aparece shira erupción cutánea.        Solicite ayuda de inmediato si:    •Siente debilidad muscular.      •Comienza a tener debilidad en un brazo o shira pierna.      •Tiene dificultad para caminar o moverse.      •Tiene problemas con el habla, la comprensión o la visión.      •Se siente confundido.      •No puede controlar la función de la vejiga o los intestinos.        Resumen    •La parestesia es shira sensación de ardor o picazón fuera de lo normal que generalmente se siente en las karen, los brazos, las piernas o los pies. También pueden producirse en otras partes del cuerpo.      •La parestesia puede producirse sin causa aparente, o puede deberse a respirar muy rápidamente (hiperventilación), la presión en un nervio, shira afección médica subyacente, los efectos secundarios de un medicamento, deficiencias nutricionales o exposición a sustancias tóxicas.      •Si tiene parestesia por mucho tiempo, es necesario que el médico le keenan shira evaluación.      Esta información no tiene johnathon fin reemplazar el consejo del médico. Asegúrese de hacerle al médico cualquier pregunta que tenga.      Document Revised: 11/23/2021 Document Reviewed: 10/08/2021    Elsevier Patient Education © 2022 [employer] Inc.

## 2022-11-16 NOTE — ED CDU PROVIDER DISPOSITION NOTE - PATIENT PORTAL LINK FT
You can access the FollowMyHealth Patient Portal offered by [facility] by registering at the following website: [email or web address]. By joining [facility]’s FollowMyHealth portal, you will also be able to view your health information using other applications (apps) compatible with our system.

## 2022-11-16 NOTE — ED CDU PROVIDER DISPOSITION NOTE - CLINICAL COURSE
No acute distress.  VSS.  MRI negative.  Feels better.  Seen and evaluated by Neurology.  D/C home.  Strict return instructions discussed.

## 2022-11-16 NOTE — ED ADULT NURSE REASSESSMENT NOTE - NS ED NURSE REASSESS COMMENT FT1
Pt a&ox3, IVL in place, Cb in reach, MA on, no s/s distress. no c.o pain. Pt updated with plan of care, will cont to monitor. wife at bedside
patient provided with lunch tray. wife at the bedside

## 2022-11-16 NOTE — ED CDU PROVIDER SUBSEQUENT DAY NOTE - ATTENDING APP SHARED VISIT CONTRIBUTION OF CARE
52-year-old Canadian-speaking male past medical history of dyslipidemia presents to the emergency department with headache and dizziness and right hand tingling that started this morning.  Patient reports he has pain to the top of his head into the back of his neck and has intermittent headaches had 1 this morning.  Patient reports his left hand started tingling given his symptoms he came to the ED for evaluation.  Stroke code called upon arrival in triage.  Fingerstick done normal.    Patient rushed to CT scan to evaluate brain and CTA and perfusion studies performed.  Initial NIH score was 1 for sensation to left hand.  Large-bore IV placed and full labs sent.  Patient has no active chest pain but does report pain to his chest on and off occasionally.  Patient evaluated by neurology at bedside.    ED work-up reviewed and shows no acute neurological findings.  Labs reviewed.  Patient sent status for MRI and telemetry monitoring.  Patient given symptomatic treatment for the headache and dizziness which improved symptoms.

## 2022-11-16 NOTE — ED CDU PROVIDER SUBSEQUENT DAY NOTE - MEDICAL DECISION MAKING DETAILS
52-year-old Afghan-speaking male past medical history of dyslipidemia presents to the emergency department with headache and dizziness and right hand tingling that started this morning.  Patient reports he has pain to the top of his head into the back of his neck and has intermittent headaches had 1 this morning.  Patient reports his left hand started tingling given his symptoms he came to the ED for evaluation.  Stroke code called upon arrival in triage.  Fingerstick done normal.    Patient rushed to CT scan to evaluate brain and CTA and perfusion studies performed.  Initial NIH score was 1 for sensation to left hand.  Large-bore IV placed and full labs sent.  Patient has no active chest pain but does report pain to his chest on and off occasionally.  Patient evaluated by neurology at bedside.    ED work-up reviewed and shows no acute neurological findings.  Labs reviewed.  Patient sent status for MRI and telemetry monitoring.  Patient given symptomatic treatment for the headache and dizziness which improved symptoms.

## 2022-12-08 ENCOUNTER — NON-APPOINTMENT (OUTPATIENT)
Age: 52
End: 2022-12-08

## 2023-01-09 ENCOUNTER — APPOINTMENT (OUTPATIENT)
Dept: ORTHOPEDIC SURGERY | Facility: CLINIC | Age: 53
End: 2023-01-09

## 2023-07-29 ENCOUNTER — EMERGENCY (EMERGENCY)
Facility: HOSPITAL | Age: 53
LOS: 0 days | Discharge: HOME | End: 2023-07-29
Attending: EMERGENCY MEDICINE
Payer: COMMERCIAL

## 2023-07-29 VITALS
HEART RATE: 83 BPM | TEMPERATURE: 98 F | DIASTOLIC BLOOD PRESSURE: 76 MMHG | SYSTOLIC BLOOD PRESSURE: 127 MMHG | OXYGEN SATURATION: 98 % | RESPIRATION RATE: 18 BRPM

## 2023-07-29 VITALS
RESPIRATION RATE: 18 BRPM | TEMPERATURE: 98 F | HEART RATE: 101 BPM | OXYGEN SATURATION: 98 % | DIASTOLIC BLOOD PRESSURE: 98 MMHG | SYSTOLIC BLOOD PRESSURE: 151 MMHG

## 2023-07-29 DIAGNOSIS — S09.90XA UNSPECIFIED INJURY OF HEAD, INITIAL ENCOUNTER: ICD-10-CM
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
## 2023-07-29 DIAGNOSIS — R22.0 LOCALIZED SWELLING, MASS AND LUMP, HEAD: ICD-10-CM
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
## 2023-07-29 PROCEDURE — 70450 CT HEAD/BRAIN W/O DYE: CPT | Mod: MA

## 2023-07-29 PROCEDURE — 99284 EMERGENCY DEPT VISIT MOD MDM: CPT

## 2023-07-29 PROCEDURE — 70486 CT MAXILLOFACIAL W/O DYE: CPT | Mod: MA

## 2023-07-29 PROCEDURE — 70450 CT HEAD/BRAIN W/O DYE: CPT | Mod: 26,MA

## 2023-07-29 PROCEDURE — 99284 EMERGENCY DEPT VISIT MOD MDM: CPT | Mod: 25

## 2023-07-29 PROCEDURE — 70486 CT MAXILLOFACIAL W/O DYE: CPT | Mod: 26,MA

## 2023-07-29 RX ORDER — ACETAMINOPHEN 500 MG
975 TABLET ORAL ONCE
Refills: 0 | Status: COMPLETED | OUTPATIENT
Start: 2023-07-29 | End: 2023-07-29

## 2023-07-29 RX ADMIN — Medication 975 MILLIGRAM(S): at 20:23

## 2023-07-29 RX ADMIN — Medication 975 MILLIGRAM(S): at 19:53
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
## 2023-07-29 NOTE — ED ADULT NURSE NOTE - NSFALLUNIVINTERV_ED_ALL_ED
Bed/Stretcher in lowest position, wheels locked, appropriate side rails in place/Call bell, personal items and telephone in reach/Instruct patient to call for assistance before getting out of bed/chair/stretcher/Non-slip footwear applied when patient is off stretcher/[place] to call system/Physically safe environment - no spills, clutter or unnecessary equipment/Purposeful proactive rounding/Room/bathroom lighting operational, light cord in reach

## 2023-07-29 NOTE — ED PROVIDER NOTE - PATIENT PORTAL LINK FT
You can access the FollowMyHealth Patient Portal offered by [facility] by registering at the following website: [email or web address]. By joining [facility]’s FollowMyHealth portal, you will also be able to view your health information using other applications (apps) compatible with our system.
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
## 2023-07-29 NOTE — ED PROVIDER NOTE - PHYSICAL EXAMINATION
CONSTITUTIONAL: Well-developed; well-nourished; in no acute distress, nontoxic appearing  SKIN: skin exam is warm and dry,  HEAD: mild left facial eyebrow swelling.   EYES: PERRL, 3 mm bilateral, no nystagmus, EOM intact; conjunctiva and sclera clear.  ENT: MMM, mild nasal bridge swelling  NECK: Supple; non tender.  ROM intact.  NEURO: awake, alert, following commands, oriented, grossly unremarkable. No Focal deficits. GCS 15.   PSYCH: Cooperative, appropriate.

## 2023-07-30 PROBLEM — E78.5 HYPERLIPIDEMIA, UNSPECIFIED: Chronic | Status: ACTIVE | Noted: 2022-11-15

## 2024-06-07 ENCOUNTER — APPOINTMENT (OUTPATIENT)
Dept: NEUROLOGY | Facility: CLINIC | Age: 54
End: 2024-06-07
Payer: MEDICAID

## 2024-06-07 VITALS
HEART RATE: 66 BPM | WEIGHT: 159 LBS | BODY MASS INDEX: 24.96 KG/M2 | SYSTOLIC BLOOD PRESSURE: 118 MMHG | HEIGHT: 67 IN | DIASTOLIC BLOOD PRESSURE: 78 MMHG

## 2024-06-07 DIAGNOSIS — R51.9 HEADACHE, UNSPECIFIED: ICD-10-CM

## 2024-06-07 DIAGNOSIS — M54.2 CERVICALGIA: ICD-10-CM

## 2024-06-07 PROCEDURE — 99204 OFFICE O/P NEW MOD 45 MIN: CPT

## 2024-06-07 RX ORDER — GABAPENTIN 300 MG/1
300 CAPSULE ORAL
Qty: 60 | Refills: 1 | Status: ACTIVE | COMMUNITY
Start: 2024-06-07 | End: 1900-01-01

## 2024-06-07 RX ORDER — SUMATRIPTAN 100 MG/1
100 TABLET, FILM COATED ORAL
Qty: 9 | Refills: 11 | Status: ACTIVE | COMMUNITY
Start: 2024-06-07 | End: 1900-01-01
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
## 2024-06-07 NOTE — ASSESSMENT
[FreeTextEntry1] : Chronic daily headache-most likely secondary to ongoing stress, lack of sleep, neck pain - Will obtain recent MRI of the brain from his medical doctor for review - A trial of gabapentin for prophylaxis - A trial of sumatriptan for abortive therapy.  Patient warned the potential side effect of medication and he reported understanding  Cervicalgia/cervicogenic headache - A trial of physical therapy - Follow-up in 6 weeks

## 2024-07-17 ENCOUNTER — APPOINTMENT (OUTPATIENT)
Dept: NEUROLOGY | Facility: CLINIC | Age: 54
End: 2024-07-17

## 2025-06-25 NOTE — ED PROVIDER NOTE - NSCAREINITIATED _GEN_ER
Gilbert Amador(Attending) Pt is following diet to prevent calcium oxalate stones  And is keeping better hydrated which is also helping with headaches.